# Patient Record
Sex: MALE
[De-identification: names, ages, dates, MRNs, and addresses within clinical notes are randomized per-mention and may not be internally consistent; named-entity substitution may affect disease eponyms.]

---
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
## 2024-07-17 ENCOUNTER — HOSPITAL ENCOUNTER (OUTPATIENT)
Age: 19
Discharge: HOME | End: 2024-07-17
Payer: COMMERCIAL

## 2024-07-17 ENCOUNTER — HOSPITAL ENCOUNTER (OUTPATIENT)
Age: 19
Discharge: LEFT BEFORE DISCHARGE | End: 2024-07-17
Payer: COMMERCIAL

## 2024-07-17 ENCOUNTER — APPOINTMENT (OUTPATIENT)
Dept: GENERAL RADIOLOGY | Age: 19
End: 2024-07-17
Attending: NURSE PRACTITIONER
Payer: COMMERCIAL

## 2024-07-17 VITALS
DIASTOLIC BLOOD PRESSURE: 69 MMHG | SYSTOLIC BLOOD PRESSURE: 118 MMHG | HEART RATE: 60 BPM | OXYGEN SATURATION: 99 % | TEMPERATURE: 99 F | RESPIRATION RATE: 18 BRPM

## 2024-07-17 VITALS
SYSTOLIC BLOOD PRESSURE: 121 MMHG | OXYGEN SATURATION: 99 % | HEART RATE: 65 BPM | TEMPERATURE: 100 F | DIASTOLIC BLOOD PRESSURE: 70 MMHG | RESPIRATION RATE: 18 BRPM

## 2024-07-17 DIAGNOSIS — S62.366A CLOSED NONDISPLACED FRACTURE OF NECK OF FIFTH METACARPAL BONE OF RIGHT HAND, INITIAL ENCOUNTER: ICD-10-CM

## 2024-07-17 DIAGNOSIS — S69.90XA HAND INJURY: Primary | ICD-10-CM

## 2024-07-17 PROCEDURE — 99203 OFFICE O/P NEW LOW 30 MIN: CPT | Performed by: NURSE PRACTITIONER

## 2024-07-17 PROCEDURE — 73130 X-RAY EXAM OF HAND: CPT | Performed by: NURSE PRACTITIONER

## 2024-07-17 PROCEDURE — 29125 APPL SHORT ARM SPLINT STATIC: CPT | Performed by: NURSE PRACTITIONER

## 2024-07-17 PROCEDURE — A4565 SLINGS: HCPCS | Performed by: NURSE PRACTITIONER

## 2024-07-17 RX ORDER — ALBUTEROL SULFATE 90 UG/1
2 AEROSOL, METERED RESPIRATORY (INHALATION) EVERY 4 HOURS PRN
COMMUNITY

## 2024-07-17 NOTE — DISCHARGE INSTRUCTIONS
Ice over cast 20 minutes at a time a few times per day  Ibuprofen 600 mg every 6 hours with food  Tylenol 650 mg every 4-6 hours as needed  Wear sling during the day, take off at night  Please call Dr. Ambriz today to schedule appointment for follow up

## 2024-07-17 NOTE — ED PROVIDER NOTES
Patient Seen in: [facility]      History   No chief complaint on file.    Stated Complaint: Hand Pain    Subjective:   Patient is a 18-year-old male presents today for right hand injury.  Reports he was angry yesterday, and punched a wall.  He denies numbness or tingling in the right upper extremity.        Objective:   Past Medical History:    Asthma (HCC)              History reviewed. No pertinent surgical history.             Social History     Socioeconomic History    Marital status: Single   Tobacco Use    Smoking status: Never    Smokeless tobacco: Never   Vaping Use    Vaping status: Former              Review of Systems   All other systems reviewed and are negative.      Positive for stated Chief Complaint: No chief complaint on file.    Other systems are as noted in HPI.  Constitutional and vital signs reviewed.      All other systems reviewed and negative except as noted above.    Physical Exam     ED Triage Vitals [07/17/24 1158]   /69   Pulse 60   Resp 18   Temp 99.1 °F (37.3 °C)   Temp src Temporal   SpO2 99 %   O2 Device None (Room air)       Current Vitals:   Vital Signs  BP: 118/69  Pulse: 60  Resp: 18  Temp: 99.1 °F (37.3 °C)  Temp src: Temporal    Oxygen Therapy  SpO2: 99 %  O2 Device: None (Room air)            Physical Exam  Constitutional:       Appearance: Normal appearance.   Musculoskeletal:      Right hand: Swelling (5th metacarpal) and bony tenderness (5th metacarpal) present. Normal range of motion. Normal strength. Normal sensation. Normal capillary refill. Normal pulse.   Neurological:      Mental Status: He is alert.       ED Course   Labs Reviewed - No data to display      MDM        Medical Decision Making  Differentials include: Hand fracture versus hand sprain versus other soft tissue injury    HPI and exam along with x-ray consistent with fifth metacarpal fracture on right.  Patient placed in ulnar gutter splint, and sling.  Discussed supportive care.   Discussed close follow-up with hand specialist.  Patient verbalized understanding and agreeable to plan of care.    Amount and/or Complexity of Data Reviewed  Radiology: ordered and independent interpretation performed. Decision-making details documented in ED Course.     Details: I personally reviewed right hand x-ray: There is a fifth metacarpal fracture    Risk  OTC drugs.        Disposition and Plan     Clinical Impression:  1. Hand injury    2. Closed nondisplaced fracture of neck of fifth metacarpal bone of right hand, initial encounter         Disposition:  Discharge  7/17/2024 12:43 pm    Follow-up:  Mike Reid MD  [address] [place] [id number]  [phone]                Medications Prescribed:  Discharge Medication List as of 7/17/2024 12:43 PM

## 2024-07-25 ENCOUNTER — OFFICE VISIT (OUTPATIENT)
Dept: SURGERY | Facility: CLINIC | Age: 19
End: 2024-07-25

## 2024-07-25 DIAGNOSIS — S62.336A CLOSED DISPLACED FRACTURE OF NECK OF FIFTH METACARPAL BONE OF RIGHT HAND, INITIAL ENCOUNTER: Primary | ICD-10-CM

## 2024-07-25 PROCEDURE — 29125 APPL SHORT ARM SPLINT STATIC: CPT | Performed by: OCCUPATIONAL THERAPIST

## 2024-07-25 PROCEDURE — 99204 OFFICE O/P NEW MOD 45 MIN: CPT | Performed by: PLASTIC SURGERY

## 2024-07-25 NOTE — H&P
Abdoulaye Wilson is a 18 year old male that presents with   Chief Complaint   Patient presents with    Fracture     RSF MC    .    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   Current no   Past no   Sunburns no   Tanning salons current no   Tanning salons past no     SKIN CANCER    Personal history of skin cancer: none      HPI:       Injury 1: RSF MC neck, nondisplaced, nonrotated  - Date: 07/16/24  - Days Since: 9      18-year-old male right-hand-dominant with an RSF fracture    Punched a wall    Immediate care, x-rayed and splinted    No pain            Review of Systems:   Constitutional: No change in appetite, chill/rigors, or fatigue  GI: No jaundice  Endocrine: No generalized weakness  Neurological: No aphasia, loss of consciousness, or seizures    Musculoskeletal:      Date of injury 7/16/24     Location right      small finger metacarpal     Mechanism Punched a wall     Treatment Seen in immediate care on 7/16/24, x-ray performed, splinted       Pain  no     Numbness None      PMH:     MEDICAL  Past Medical History:    Asthma (HCC)        SURGICAL  History reviewed. No pertinent surgical history.     ALLERIGIES  No Known Allergies     MEDICATIONS  Current Outpatient Medications   Medication Sig Dispense Refill    albuterol 108 (90 Base) MCG/ACT Inhalation Aero Soln Inhale 2 puffs into the lungs every 4 (four) hours as needed for Wheezing. (Patient not taking: Reported on 7/25/2024)          SOCIAL HISTORY  Social History     Socioeconomic History    Marital status: Single   Tobacco Use    Smoking status: Never    Smokeless tobacco: Never   Vaping Use    Vaping status: Former   Other Topics Concern    Right Handed Yes        FAMILY HISTORY  History reviewed. No pertinent family history.        PHYSICAL EXAM:     CONSTITUTIONAL: Overall appearance - Normal  HEENT: Normocephalic  EYES: Conjunctiva - Right: Normal, Left: Normal; EOMI  EARS: Inspection - Right: Normal, Left: Normal  NECK/THYROID: Inspection - Normal, Palpation - Normal, Thyroid gland - Normal, No adenopathy  RESPIRATORY: Inspection - Normal, Effort - Normal  CARDIOVASCULAR: Regular rhythm, No murmurs  ABDOMEN: Inspection - Normal, No abdominal tenderness  NEURO: Memory intact  PSYCH: Oriented to person, place, time, and situation, Appropriate mood and affect      Hand Physical Exam:     No rotation on flexion  Metacarpal neck tenderness    X-ray independently interpreted: Metacarpal neck fracture minimally displaced    ASSESSMENT/PLAN:     Fracture: RSF metacarpal neck, nondisplaced, nonrotated    We discussed the fracture/dislocation and the treatment options.  Questions were answered and the patient wishes to proceed with treatment.     SPLINT - This fracture can be treated with a splint.  We discussed splint care and instructions, as well as restrictions.  If there is displacement of the fracture, surgery may be required.    Ulnar gutter splint    We discussed restrictions.    Even with satisfactory healing, the hand/digit may not regain normal ROM or normal function.      Wash daily  2 weeks start OT active range  3 weeks start strengthening and resistive  4 weeks back to work regular duty, 8/22/2024 7/25/2024  Mike Ambriz MD      +++++++++++++++++++++++++++++++++++++++++      MEDICAL DECISION MAKING    PROBLEMS      MODERATE    (number / complexity)          Acute complicated injury    DATA         MODERATE    (amount / complexity)          X-rays independently reviewed    MANAGEMENT RISK  LOW    (complications/ morbidity)       Splint/OT                  MDM LEVEL    MODERATE

## 2024-07-25 NOTE — PROGRESS NOTES
Subjective: I hurt my RSF.      Objective:     Current level of performance:  ADL: Independent  Work: No work involving the right hand.  Leisure: Video Games    Measurements/Tests:  ROM:         N/A         Treatment Provided this day: Fabricated a right ulnar gutter splint per order.    Treatment Time: 30 minutes      Summary/Analysis of Treatment session: Tolerated the fabrication of a right ulnar gutter splint well.      Plan: To be compliant with the wear and care of right ulnar gutter splint x 3 weeks.      Follow up in:  x 2 weeks          Josef Luna  OTKAREEM/L

## 2024-08-06 ENCOUNTER — APPOINTMENT (OUTPATIENT)
Dept: SURGERY | Facility: CLINIC | Age: 19
End: 2024-08-06

## 2024-08-06 DIAGNOSIS — M25.642 STIFFNESS OF JOINT, HAND, LEFT: ICD-10-CM

## 2024-08-06 DIAGNOSIS — M62.81 DISTAL MUSCLE WEAKNESS: Primary | ICD-10-CM

## 2024-08-06 PROCEDURE — 97110 THERAPEUTIC EXERCISES: CPT | Performed by: OCCUPATIONAL THERAPIST

## 2024-08-06 PROCEDURE — 97165 OT EVAL LOW COMPLEX 30 MIN: CPT | Performed by: OCCUPATIONAL THERAPIST

## 2024-08-07 NOTE — PROGRESS NOTES
OCCUPATIONAL THERAPY EVALUATION:   Abdoulaye Wilson   [id number]       SUBJECTIVE:    HX of Injury: RSF MC Neck fracture non-displaced.  Chief Complaint:  No complaints.    Precautions:  2 # lifting restriction with the right hand.  Premorbid Functional Status: Independent w/ Occ. duties, Independent w/ driving / sitting, Independent w/ ADL's  Current Level of Function: Working with restrictions.  Employment: Working restricted duty  Hand Dominance: right  Living Situation: Family  Barriers to Learning: None  Patient Goals: Full use of the right hand    Imaging/Tests: X-ray        OBJECTIVE DATA:   PAIN:   Rating (1/10): 1/10 at rest, 2/10 with activity  Location:     OBSERVATION:   Guarding movements    ORTHOTICS:    Right Ulnar Gutter Splint    SSENSORY:  Intact      AROM/PROM:  (Degrees)  RIGHT HAND:    Thumb IF MF RF SF   MP     80   PIP     80   DIP     40   HICKEY     190 HICKEY             STRENGTH: (lbs) Right Average Left Average   : NT NT   2 pt Pinch:     3 pt Pinch:     Lateral Pinch:         ASSESSMENT & PLAN OF CARE:    Treatment Provided: Patient was seen for an initial evaluation, hand washing:  HEP:  AROM, Tendon glides, x 20 reps per set, x 5 sets daily. Reviewed hand elevation importance. Written handout was provided to reinforce today's treatment and educational session.       Rehabilitation Potential: Good    CLINICAL ASSESSMENT:    Patient/Caregiver Education Provided: Yes    Treatment Plan:  Therapeutic Exercise  Therapeutic Activities  Modalities  Manual Therapy  Patient/Family Education  Splinting: Right Ulnar Gutter Splint    GOALS:  Short term goals to be reached in x 1 week:    1) Independent with HEP..      Long term goals to be reached in x 3 weeks:    1) Full functional use of the involved extremity for self-care, leisure and work related tasks:.        Patient will be seen 2 x /week for 3 weeks or a total of 6 visits.   Pt. was advised regarding the findings of this evaluation and  agrees to the plan of care.     Josef MARTINEZ, OTRL

## 2024-08-12 ENCOUNTER — TELEPHONE (OUTPATIENT)
Dept: SURGERY | Facility: CLINIC | Age: 19
End: 2024-08-12

## 2024-08-12 NOTE — TELEPHONE ENCOUNTER
Left voice message for pt to call the office to reschedule OT appointment that was cancelled via [employer] for 8/13/24.  Dr. Ambriz notified.

## (undated) NOTE — LETTER
Date & Time: 7/17/2024, 12:32 PM  Patient: Abdoulaye Wilson  Encounter Provider(s):    Fredrick Mccoy APRN       To Whom It May Concern:    Abdoulaye Wilson was seen and treated in our department on 7/17/2024. Please allow modified work duties until he follows up with hand specialist and updated recommendations are provided.    If you have any questions or concerns, please do not hesitate to call.      ROBIN GaonaP-BC  _____________________________  Physician/APC Signature